# Patient Record
Sex: FEMALE | URBAN - METROPOLITAN AREA
[De-identification: names, ages, dates, MRNs, and addresses within clinical notes are randomized per-mention and may not be internally consistent; named-entity substitution may affect disease eponyms.]

---

## 2022-03-29 ENCOUNTER — OFFICE VISIT (OUTPATIENT)
Dept: URBAN - METROPOLITAN AREA CLINIC 5 | Facility: CLINIC | Age: 27
End: 2022-03-29
Payer: COMMERCIAL

## 2022-03-29 DIAGNOSIS — H10.31 UNSPECIFIED ACUTE CONJUNCTIVITIS, RIGHT EYE: Primary | ICD-10-CM

## 2022-03-29 PROCEDURE — 99203 OFFICE O/P NEW LOW 30 MIN: CPT | Performed by: OPTOMETRIST

## 2022-03-29 RX ORDER — NEOMYCIN SULFATE, POLYMYXIN B SULFATE AND DEXAMETHASONE 3.5; 10000; 1 MG/ML; [USP'U]/ML; MG/ML
SUSPENSION OPHTHALMIC
Qty: 5 | Refills: 0 | Status: ACTIVE
Start: 2022-03-29

## 2022-03-29 RX ORDER — NEOMYCIN SULFATE, POLYMYXIN B SULFATE AND DEXAMETHASONE 3.5; 10000; 1 MG/G; [USP'U]/G; MG/G
OINTMENT OPHTHALMIC
Qty: 3.5 | Refills: 0 | Status: INACTIVE
Start: 2022-03-29 | End: 2022-03-29

## 2022-03-29 ASSESSMENT — INTRAOCULAR PRESSURE
OS: 14
OD: 18

## 2022-03-29 NOTE — IMPRESSION/PLAN
Impression: Unspecified acute conjunctivitis, right eye: H10.31. Plan: Unspecified conjunctivitis - likely allergic d/t itchiness symptoms. Initiate Maxitrol asia QID OD, instructed to use OS if symptoms manifest OS. Patient educated on condition, contagious nature of dx.

## 2022-04-01 ENCOUNTER — OFFICE VISIT (OUTPATIENT)
Dept: URBAN - METROPOLITAN AREA CLINIC 5 | Facility: CLINIC | Age: 27
End: 2022-04-01
Payer: COMMERCIAL

## 2022-04-01 DIAGNOSIS — H40.053 OCULAR HYPERTENSION, BILATERAL: ICD-10-CM

## 2022-04-01 PROCEDURE — 92012 INTRM OPH EXAM EST PATIENT: CPT | Performed by: OPTOMETRIST

## 2022-04-01 RX ORDER — DORZOLAMIDE HYDROCHLORIDE AND TIMOLOL MALEATE 20; 5 MG/ML; MG/ML
SOLUTION/ DROPS OPHTHALMIC
Qty: 5 | Refills: 0 | Status: ACTIVE
Start: 2022-04-01

## 2022-04-01 ASSESSMENT — INTRAOCULAR PRESSURE
OD: 29
OS: 29

## 2022-04-01 NOTE — IMPRESSION/PLAN
Impression: Ocular hypertension, bilateral: H40.053. Plan: Ocular hypertension - start Cosopt BID OU. RTC 1 week for IOP check OU.

## 2022-04-01 NOTE — IMPRESSION/PLAN
Impression: Unspecified acute conjunctivitis, right eye: H10.31. Plan: Largely resolved OD. Continue maxitrol BID x 2 days then QD x 2 days OD then discontinue. RTC 1 week for IOP check.

## 2022-04-08 ENCOUNTER — OFFICE VISIT (OUTPATIENT)
Dept: URBAN - METROPOLITAN AREA CLINIC 5 | Facility: CLINIC | Age: 27
End: 2022-04-08
Payer: COMMERCIAL

## 2022-04-08 DIAGNOSIS — H10.45 OTHER CHRONIC ALLERGIC CONJUNCTIVITIS: ICD-10-CM

## 2022-04-08 DIAGNOSIS — H52.13 MYOPIA, BILATERAL: Primary | ICD-10-CM

## 2022-04-08 PROCEDURE — 92012 INTRM OPH EXAM EST PATIENT: CPT | Performed by: OPTOMETRIST

## 2022-04-08 ASSESSMENT — VISUAL ACUITY
OD: 20/20
OS: 20/20

## 2022-04-08 ASSESSMENT — INTRAOCULAR PRESSURE
OS: 14
OD: 13

## 2022-04-08 NOTE — IMPRESSION/PLAN
Impression: Unspecified acute conjunctivitis, right eye: H10.31. Plan: Resolved. D/c maxitrol asia BID OU. Monitor.

## 2022-04-08 NOTE — IMPRESSION/PLAN
Impression: Myopia, bilateral: H52.13. Plan: SRx released to patient for FTW. Monitor for changes in refractive error on an annual basis or sooner prn.

## 2024-09-23 ENCOUNTER — REFRACTIVE (OUTPATIENT)
Dept: URBAN - METROPOLITAN AREA CLINIC 4 | Facility: CLINIC | Age: 29
End: 2024-09-23

## 2024-09-23 DIAGNOSIS — H53.8 OTHER VISUAL DISTURBANCES: Primary | ICD-10-CM

## 2024-09-23 ASSESSMENT — KERATOMETRY
OD: 45.13
OS: 45.13